# Patient Record
(demographics unavailable — no encounter records)

---

## 2024-10-11 NOTE — PHYSICAL EXAM
[Person] : oriented to person [Place] : oriented to place [Time] : oriented to time [Naming Objects] : no difficulty naming common objects [Fluency] : fluency intact [Comprehension] : comprehension intact [Past History] : adequate knowledge of personal past history [Cranial Nerves Optic (II)] : visual acuity intact bilaterally,  visual fields full to confrontation, pupils equal round and reactive to light [Cranial Nerves Oculomotor (III)] : extraocular motion intact [Cranial Nerves Trigeminal (V)] : facial sensation intact symmetrically [Cranial Nerves Facial (VII)] : face symmetrical [Cranial Nerves Vestibulocochlear (VIII)] : hearing was intact bilaterally [Cranial Nerves Glossopharyngeal (IX)] : tongue and palate midline [Cranial Nerves Accessory (XI - Cranial And Spinal)] : head turning and shoulder shrug symmetric [Cranial Nerves Hypoglossal (XII)] : there was no tongue deviation with protrusion [Motor Tone] : muscle tone was normal in all four extremities [Motor Strength] : muscle strength was normal in all four extremities [No Muscle Atrophy] : normal bulk in all four extremities [Sensation Tactile Decrease] : light touch was intact [Abnormal Walk] : normal gait [Balance] : balance was intact [2+] : Ankle jerk left 2+ [General Appearance - In No Acute Distress] : in no acute distress [Affect] : the affect was normal [Mood] : the mood was normal [Short Term Intact] : short term memory intact [Writing A Sentence] : no difficulty writing a sentence [Current Events] : adequate knowledge of current events [Motor Handedness Right-Handed] : the patient is right hand dominant [Past-pointing] : there was no past-pointing [Tremor] : no tremor present [Plantar Reflex Right Only] : normal on the right [Plantar Reflex Left Only] : normal on the left [PERRL With Normal Accommodation] : pupils were equal in size, round, reactive to light, with normal accommodation [Extraocular Movements] : extraocular movements were intact [Neck Appearance] : the appearance of the neck was normal [] : no respiratory distress [No Spinal Tenderness] : no spinal tenderness [Involuntary Movements] : no involuntary movements were seen [FreeTextEntry1] : b/l trap msk ttp, restricted rom in neck   levator scap. ttp

## 2024-10-11 NOTE — ASSESSMENT
[FreeTextEntry1] : 56-year-old male with no significant past medical history presents today with complaints of neck pain with tingling and numbness to bilateral arms left greater than right.For the past 6 months completed physical therapy with minimal improvement as well as use of anti-inflammatory.  Neuroexam nonfocal will obtain cervical spine MRI rule out any herniated disc and trial of gabapentin 100 mg nightly titrate to 300 mg.  Side effects discussed

## 2024-10-11 NOTE — HISTORY OF PRESENT ILLNESS
[FreeTextEntry1] : 56-year-old male with no significant past medical history presents today with complaints of neck pain with tingling and numbness to bilateral arms left greater than right.  Reports has been going on for at least 6 months which she completed physical therapy for 2 weeks ago.  He was seen by his PCP and has been taking naproxen with minimal effect.  These pains can wake him up at night.  He denies any recent injury difficulty swallowing or any weakness. Does construction for work